# Patient Record
Sex: FEMALE | Race: ASIAN | NOT HISPANIC OR LATINO | Employment: FULL TIME | URBAN - METROPOLITAN AREA
[De-identification: names, ages, dates, MRNs, and addresses within clinical notes are randomized per-mention and may not be internally consistent; named-entity substitution may affect disease eponyms.]

---
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
## 2020-11-22 ENCOUNTER — HEALTH MAINTENANCE LETTER (OUTPATIENT)
Age: 20
End: 2020-11-22

## 2021-09-19 ENCOUNTER — HEALTH MAINTENANCE LETTER (OUTPATIENT)
Age: 21
End: 2021-09-19

## 2022-01-09 ENCOUNTER — HEALTH MAINTENANCE LETTER (OUTPATIENT)
Age: 22
End: 2022-01-09

## 2022-10-04 ENCOUNTER — OFFICE VISIT (OUTPATIENT)
Dept: FAMILY MEDICINE | Facility: CLINIC | Age: 22
End: 2022-10-04
Payer: COMMERCIAL

## 2022-10-04 VITALS
HEART RATE: 85 BPM | SYSTOLIC BLOOD PRESSURE: 97 MMHG | WEIGHT: 114.8 LBS | TEMPERATURE: 98.6 F | RESPIRATION RATE: 16 BRPM | BODY MASS INDEX: 24.77 KG/M2 | HEIGHT: 57 IN | OXYGEN SATURATION: 99 % | DIASTOLIC BLOOD PRESSURE: 62 MMHG

## 2022-10-04 DIAGNOSIS — H61.891 IRRITATION OF EXTERNAL EAR CANAL, RIGHT: Primary | ICD-10-CM

## 2022-10-04 PROCEDURE — 99203 OFFICE O/P NEW LOW 30 MIN: CPT | Performed by: FAMILY MEDICINE

## 2022-10-04 NOTE — PROGRESS NOTES
Assessment & Plan   Tina Palma is a 22 year old female without significant pmhx who present today as new patient for concerns below:    Irritation of external ear canal, right  Original bleeding likely related to irritation from chronic use of Q-tips.  Notably she also has significant inflammation of the canal on the left side.  Ongoing findings of dry blood likely related to ongoing irritation as she checks intermittently with Q-tips.  Also possibly dislodging healing crusts with ofloxacin drops.  Recommend to stop placing anything in her ears and at this time likely no indication for ofloxacin drops as well as no indication of otitis externa.  Recommend to leave the area alone, only using warm water to flush out her ears if needed, and expect will heal on its own over time. Reassurance provided given bleeding had previously stopped on its own, no pain, and no change in hearing. Recommend to follow-up if continues to have concerns.    Follow-up for CPE    I spent a total of 31 minutes on the day of the visit.   Time spent doing chart review, history and exam, documentation and further activities per the note    Benjamin Rosenstein, MD, MA  [facility]    Jerrod Wilder is a 22 year old, presenting for the following health issues:  Ear Problem (Liquid in right ear. Pt put q-tip in ear and blood came out. )      HPI   Right Ear  -Last week Saturday (9/24/2022) noticed some liquid in her ear, thought it was ear wax  -Put q-tip in ear to check, and saw blood  -No pain with use of qtip but noticed some stinging later  -Has used headphones in past at high volumes and wondered if related to that  -[facility], given antibiotic (ofloxacin) ear drops which she has been using for 5 days  -Bleeding until Sunday 9/25/22  -Still seeing some dry blood however when checks with qtip. Otherwise no evidence of ongoing bleeding, nothing on her  "pillow when she wakes.  -No pain in or around her ears. 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  Physical Exam     General: Awake, seated comfortably, appears well and NAD   HEENT:  - Head: Atraumatic, normocephalic  - Eyes: Corneas clear, sclera without injection, no discharge, EOMI, PERRLA  - Ears:   >Left: Canal patent with moderate irritation, no cerumen, TM normal with good landarks  >Right: Canal patent, dry blood in canal with area of bleeding along anterior canal wall, TM normal, no evidence of rupture  - Nose: Nares patent, no rhinorrhea, no congestion  - Throat: Oropharynx clear without lesions, mallampati IV  CV: RRR, normal S1/S2, no murmurs/rubs/gallops.  Pulm: Normal WOB, lungs CTAB, no wheezes or crackles, good air movement   Neuro: Alert, answering questions appropriately, normal thought processes; Normal Gait               "

## 2022-11-21 ENCOUNTER — HEALTH MAINTENANCE LETTER (OUTPATIENT)
Age: 22
End: 2022-11-21

## 2023-04-14 ENCOUNTER — ALLIED HEALTH/NURSE VISIT (OUTPATIENT)
Dept: RESEARCH | Facility: CLINIC | Age: 23
End: 2023-04-14
Payer: COMMERCIAL

## 2023-04-14 VITALS
DIASTOLIC BLOOD PRESSURE: 67 MMHG | SYSTOLIC BLOOD PRESSURE: 119 MMHG | OXYGEN SATURATION: 98 % | HEART RATE: 80 BPM | RESPIRATION RATE: 16 BRPM | BODY MASS INDEX: 24.16 KG/M2 | HEIGHT: 57 IN | WEIGHT: 112 LBS

## 2023-04-14 DIAGNOSIS — Z00.6 EXAMINATION OF PARTICIPANT OR CONTROL IN CLINICAL RESEARCH: Primary | ICD-10-CM

## 2023-04-14 PROCEDURE — 99207 PR NO CHARGE NURSE ONLY: CPT

## 2023-04-14 NOTE — PROGRESS NOTES
"  [place] Sleep Study Inclusion/Exclusion Criteria:    Study Name: [place]  : Fauzia Eng MD    Study Description: The purpose of this study is to collect sleep data for product research and development. We will compare the performance of the Smartwatch to a medical-grade Home Sleep Test (HST). We hope to learn whether the Smartwatch can be used to track sleep quality at home.    Protocol Version: 3.0  22-DEC-2022     Criteria #  Inclusion Criteria (ALL MUST BE YES)  YES/NO/N/A   1  Adult (age > 18 years old) Yes   2  Subject has a reliable WiFi network (examples of \"reliable\": able to video-conference call with a clear image, able to stream movies or video without interruption, play online computer games) Yes   3  Subject has access to a computer or smartphone with audiovisual capabilities (e.g., webcam and microphone/speaker*)  Yes   4  Subject is willing to comply with the study procedures    Yes         * applicable for subjects that are remotely consented and/or trained.     Criteria # Exclusion Criteria (ALL MUST BE NO) YES/NO/N/A   1  Active COVID infection   No   2  Decisionally impaired participants (no surrogate consenting is allowed)    No   3  Not understanding written and spoken English     No   4  Open wounds(s) on the wrist and/or forearm (in area where Smartwatch would be worn) No   5  Tattoos, large moles or scars on the dorsal aspect of wrist where the Smartwatch would be worn; individuals with tattoo on only one wrist may participate, if Smartwatch is worn on non-tattooed wrist    No   6  Known sensitivity or allergy to component of the Smartwatch   No   7  Planned travel across 2 or more times zones during study participation   No   8  Planned travel away from home for more than 5 days during the study period No   9  Overnight rotating shift work     No   10  Active and adherent to treatment for sleep apnea   (e.g., CPAP, dental appliance, Hypoglossal nerve stimulator)    " No   11  Plans to start treatment for apnea within the study timeframe    No   12  Overnight supplemental oxygen use   No   13    Employed by a company that develops or sells medical and/or fitness devices (e.g., ECG monitors, wearable fitness bands, sleep monitors, etc.) or are technology journalists (e.g., professional bloggers, TV, magazine, newspaper reporters, etc.) No   14    Participated in a previous sleep study that used a wrist-worn sensor device by same sponsor  No     Patient does fulfill study inclusion criteria and no exclusion criteria are found.     Fauzia Eng MD    14-APR-2023    Janis Blandon

## 2023-04-14 NOTE — PROGRESS NOTES
[place] Study Consent    Study Description: The purpose of this study is to collect sleep data for product research and development. We will compare the performance of the Smartwatch to a medical-grade Home Sleep Test (HST). We hope to learn whether the Smartwatch can be used to track sleep quality at home.        Tina Palma a 22 year old female, was on-site  today to discuss participation in the [place] sleep data collection study.     The consent form was reviewed with the patient.     The review of the study included:    Study Purpose     COVID-19 Criteria     Participant Responsibilities      Study Data and Devices    Benefits and Risks of Participation    Compensation and Costs of Participation    Voluntary Participation    Confidentiality     Injury and Legal Rights    Protocol Version: 3.0    The subject was queried in regards to her willingness to continue and her questions were answered to her satisfaction.     The patient has given her agreement to volunteer and participate in the above noted study.     The Consent  and HIPAA form version 4.0 28-FEB-2023 was signed at 13:24  on  14-APR-2023 with the Clinical Research Unit of [facility].     A copy of the [place] consent will be placed in subject's medical record. A copy of the consent form was given to the subject today.    Study data is directly entered into Epic and [employer] per protocol.     No study procedures were done prior to Tina Palma providing informed consent.       14-APR-2023    Janis Blandon

## 2023-04-14 NOTE — PROGRESS NOTES
"[facility] In-Person Study Note    Study Description: The purpose of this study is to collect sleep data for product research and development. We will compare the performance of the Smartwatch to a medical-grade Home Sleep Test (HST). We hope to learn whether the Smartwatch can be used to track sleep quality at home.       Subject ID:      SCREENING     Demographic Info  Tina Palma   2000          22 year old  female    Race:   Ethnicity: Non-/     Ceja Scale: OBAN Ceja: 2    Medical History:  Past Medical History:   Diagnosis Date     NO ACTIVE PROBLEMS        Sleep Apnea Diagnosis: No    Allergies:  No Known Allergies     Current Medications:           as of April 14, 2023  1:39 PM     You have not been prescribed any medications.         Past Surgical History:  Past Surgical History:   Procedure Laterality Date     NO HISTORY OF SURGERY                Vitals:  Time Subject Sat: 1300   /67 (BP Location: Right arm, Patient Position: Sitting, Cuff Size: Adult Regular)   Pulse 80   Resp 16   Ht 1.448 m (4' 9\")   Wt 50.8 kg (112 lb)   SpO2 98%   BMI 24.24 kg/m         Lifestyle  Occupation:     Do you have a Bed Partner/Co-Sleeper? No   Previous Sleep Study?: No       (If Yes) Initial AHI Score: N/A    Alcohol Use: No  Smoking History: No      Measurements & Preferences:  Dominant Hand: Right   Preferred Watch Wrist: Left    Left Wrist:  Circumference (mm): 150   Hairiness: A: Thin Hair, Low Density   Tattoos, Moles, Scars? None    Right Wrist:   Circumference (mm): 148   Hairiness: A: Thin Hair, Low Density   Tattoos, Moles, Scars? None    Was data collected?: No    ENROLLMENT & DEVICES      Device IDs:  Watch ID: [id number]    Watch Size: 40 mm   Band Size: M/L  Natural Notch: 4   Secure Notch: 4   Watch Setting Worn: 4   Phone ID: [id number]  Nox ID: [id number]     Has the Subject Enrolled in the Study Ace: Yes   Confirmation of Enrollment?: Yes "   Enrollment Date: 14-APR-2023  Smartwatch Training Completed? Yes   Smartwatch Training Date: 14-APR-2023  HSAT Training Completed? Yes   HSAT Training Date: 14-APR-2023 14-APR-2023  Janis Blandon

## 2023-04-16 ENCOUNTER — HEALTH MAINTENANCE LETTER (OUTPATIENT)
Age: 23
End: 2023-04-16

## 2023-04-18 NOTE — PROGRESS NOTES
[facility] HSAT Kit Return  Study Description: The purpose of this study is to collect sleep data for product research and development. We will compare the performance of the Smartwatch to a medical-grade Home Sleep Test (HST). We hope to learn whether the Smartwatch can be used to track sleep quality at home.      Subject Number:     Study Day: Week 2    Tracking Number: N/A    Compliance Questions:  Did you wear a T-Shirt over the heart monitor? Yes  Did you double tape device tubing/wires?Yes  Did you turn-off your heart monitor each morning after collection?Yes  Did all equipment function correctly and stay-on throughout night?Yes  Did you see the red light underneath the watch turn-on both nights?Yes      Participant returned HSAT kit with all devices returned. Participant verbalized understanding that if their data is unusable per sponsor, they will conduct one additional set of HSAT recordings. All other questions/concerns addressed.     Adverse Events & Con Med Assessment Performed?   [x] None    (If yes, please generate adverse event report for PI to ayaan)    18-APR-2023    Jessie Gibson RN

## 2023-04-19 ENCOUNTER — ALLIED HEALTH/NURSE VISIT (OUTPATIENT)
Dept: RESEARCH | Facility: CLINIC | Age: 23
End: 2023-04-19
Payer: COMMERCIAL

## 2023-04-19 DIAGNOSIS — Z00.6 RESEARCH SUBJECT: Primary | ICD-10-CM

## 2023-04-20 ENCOUNTER — TELEPHONE (OUTPATIENT)
Dept: RESEARCH | Facility: CLINIC | Age: 23
End: 2023-04-20
Payer: COMMERCIAL

## 2023-04-21 NOTE — TELEPHONE ENCOUNTER
[place] HSAT Results Phone Call  Study Description: The purpose of this study is to collect sleep data for product research and development. We will compare the performance of the Smartwatch to a medical-grade Home Sleep Test (HST). We hope to learn whether the Smartwatch can be used to track sleep quality at home.      Tina Palma was called today to review results of her Home Sleep Test (HSAT).     They were informed of the significance of the results and they affirmed understanding. Additionally I informed them that these results are available for review by their provider in their chart.  They had no further questions at this time.    Additional Comments: HR . Duration greater than 90 was 2.2 minutes    AHI Score: AHI less than 5  0.0, 0.6, 0.1, 0.4    Results:  NIGHT 1  Was it scorable?: Yes     Channel Presence: Scored and all 6 channels present  Select Missing Channels: N/A     (If applicable)      Analysis Start Date: 4/16/2023   Analysis Duration: 7hr 32min  Analysis Start Time: 10:50 pm        Analysis Stop Time: 6:22 am   Est Total Sleep Time: 6 hr 45 min      NIGHT 1 Scorer 1 Scorer 2   Respiratory Parameters   Apnea + Hypopneas (AH)   Total    0 /h   0.6 /h   Supine    0 /h   1 /h   Non-Supine   0 /h   0 /h   Count   0    4        Total Total    Obstructive (OA)    0 /h   0 /h   Hypopneas    0 /h   0.6 /h   Central Apnea Hypopnea (CA+CH)    0 /h   0 /h   Oxygen Saturation (SpO2)   Oxygen Desaturation Index (KIARA)    0.9 /h   0.9 /h   Minimum SpO2    93 %   93 %   SpO2 Duration < 88%    0 %   0 %   Average Desat Drop    4 %   4 %   Position & Analysis Time   Supine (in TST) Duration    246.5 min   246.5 min       NIGHT 2  Was it scorable?: Yes (If no, delete the table below)     Channel Presence: Scored and all 6 channels present  Select Missing Channels: N/A     (If applicable)    Analysis Start Date: 4/17/2023   Analysis Duration: 6hr 53min  Analysis Start Time: 11:27 pm        Analysis Stop Time:  6:20 am    Est Total Sleep Time: 6h 42 m       NIGHT 2 Scorer 1 Scorer 2   Respiratory Parameters   Apnea + Hypopneas (AH)   Total    0.1 /h   0.4 /h   Supine    0.7 /h   1.3 /h   Non-Supine   0 /h   0.2 /h   Count   1    3        Total Total    Obstructive (OA)    0.1 /h   0.1 /h   Hypopneas    0 /h   0.3 /h   Central Apnea Hypopnea (CA+CH)    0 /h   0 /h   Oxygen Saturation (SpO2)   Oxygen Desaturation Index (KIARA)    0.4 /h   0.4 /h   Minimum SpO2    90 %   90 %   SpO2 Duration < 88%    0 %   0 %   Average Desat Drop    7 %   7 %   Position & Analysis Time   Supine (in TST) Duration    89.7 min   89.7 min       20-APR-2023    Ruth Ly NP

## 2023-04-24 ENCOUNTER — VIRTUAL VISIT (OUTPATIENT)
Dept: RESEARCH | Facility: CLINIC | Age: 23
End: 2023-04-24
Payer: COMMERCIAL

## 2023-04-24 DIAGNOSIS — Z00.6 EXAMINATION OF PARTICIPANT OR CONTROL IN CLINICAL RESEARCH: Primary | ICD-10-CM

## 2023-04-24 PROCEDURE — 99207 PR NO CHARGE NURSE ONLY: CPT

## 2023-04-24 NOTE — PROGRESS NOTES
" [place] Study Phone Visit    Study Description: The purpose of this study is to collect sleep data for product research and development. We will compare the performance of the Smartwatch to a medical-grade Home Sleep Test (HST). We hope to learn whether the Smartwatch can be used to track sleep quality at home.      Subject Number:     Study Day: Week 3    [place] Study Subject was called today to:    Review Compliance     Answer subject questions    Deliver study protocol reminders to subject    Reminders Checklist:   [x]  Connected to WiFi  [x]  Completing both morning and evening surveys  [x]  Watch and Phone on  near each other after completed morning survey   [x]  Take devices off before showering/getting them wet  [x]  Make sure to dismiss any update notifications. -Tap \"Not Now\"  [x]  Remind them of next appointment with us     Adverse Events & Con Med Assessment Performed?   [x]     (If yes, please generate adverse event report for PI to cosign)      24-APR-2023    Arnol Kaplan    "

## 2023-05-01 ENCOUNTER — VIRTUAL VISIT (OUTPATIENT)
Dept: RESEARCH | Facility: CLINIC | Age: 23
End: 2023-05-01
Payer: COMMERCIAL

## 2023-05-01 DIAGNOSIS — Z00.6 EXAMINATION OF PARTICIPANT OR CONTROL IN CLINICAL RESEARCH: Primary | ICD-10-CM

## 2023-05-01 PROCEDURE — 99207 PR NO CHARGE NURSE ONLY: CPT

## 2023-05-01 NOTE — PROGRESS NOTES
" [place] Study Phone Visit    Study Description: The purpose of this study is to collect sleep data for product research and development. We will compare the performance of the Smartwatch to a medical-grade Home Sleep Test (HST). We hope to learn whether the Smartwatch can be used to track sleep quality at home.      Subject Number:     Study Day: Week 4    [place] Study Subject was called today to:    Review Compliance     Answer subject questions    Deliver study protocol reminders to subject    Reminders Checklist:   [x]  Connected to WiFi  [x]  Completing both morning and evening surveys  [x]  Watch and Phone on  near each other after completed morning survey   [x]  Take devices off before showering/getting them wet  [x] Make sure to dismiss any update notifications. -Tap \"Not Now\"  [x]  Good HSAT   [x]  Remind them of next appointment with us     Adverse Events & Con Med Assessment Performed?   [x]     (If yes, please generate adverse event report for PI to cosign)      1-MAY-2023    Steve Aj    "

## 2023-05-08 ENCOUNTER — VIRTUAL VISIT (OUTPATIENT)
Dept: RESEARCH | Facility: CLINIC | Age: 23
End: 2023-05-08
Payer: COMMERCIAL

## 2023-05-08 DIAGNOSIS — Z00.6 EXAMINATION OF PARTICIPANT OR CONTROL IN CLINICAL RESEARCH: Primary | ICD-10-CM

## 2023-05-08 PROCEDURE — 99207 PR NO CHARGE NURSE ONLY: CPT

## 2023-05-08 NOTE — PROGRESS NOTES
" [place] Study Phone Visit    Study Description: The purpose of this study is to collect sleep data for product research and development. We will compare the performance of the Smartwatch to a medical-grade Home Sleep Test (HST). We hope to learn whether the Smartwatch can be used to track sleep quality at home.      Subject Number:     Study Day: Week 5    [place] Study Subject was called today to:    Review Compliance     Answer subject questions    Deliver study protocol reminders to subject    Reminders Checklist:   [x]  Connected to WiFi  [x]  Completing both morning and evening surveys  [x]  Watch and Phone on  near each other after completed morning survey   [x] Take devices off before showering/getting them wet  [x]  Make sure to dismiss any update notifications. -Tap \"Not Now\"  [x]  Good HSAT   [x]  Remind them of next appointment with us     (Applicable during last 4 days)  [x]  NO WATCH WEAR JUST SURVEY     Adverse Events & Con Med Assessment Performed?   [x]     (If yes, please generate adverse event report for PI to cosign)      08-MAY-2023    Aman Shah      "

## 2023-05-18 ENCOUNTER — ALLIED HEALTH/NURSE VISIT (OUTPATIENT)
Dept: RESEARCH | Facility: CLINIC | Age: 23
End: 2023-05-18
Payer: COMMERCIAL

## 2023-05-18 DIAGNOSIS — Z00.6 EXAMINATION OF PARTICIPANT OR CONTROL IN CLINICAL RESEARCH: Primary | ICD-10-CM

## 2023-05-18 PROCEDURE — 99207 PR NO CHARGE NURSE ONLY: CPT

## 2023-05-18 NOTE — PROGRESS NOTES
[place] Study End Note    Study Description: The purpose of this study is to collect sleep data for product research and development. We will compare the performance of the Smartwatch to a medical-grade Home Sleep Test (HST). We hope to learn whether the Smartwatch can be used to track sleep quality at home.        Study Termination/Completion Date: 18-MAY-2023  Reason for Termination (If Applicable): Completed     Subject returned all study devices today and this completes this study for the subject.    Adverse Events & Con Med Assessment Performed?   [x]       18-MAY-2023    Aman Shah

## 2024-04-17 ENCOUNTER — OFFICE VISIT (OUTPATIENT)
Dept: FAMILY MEDICINE | Facility: CLINIC | Age: 24
End: 2024-04-17
Payer: COMMERCIAL

## 2024-04-17 VITALS
SYSTOLIC BLOOD PRESSURE: 103 MMHG | WEIGHT: 117.8 LBS | TEMPERATURE: 98 F | BODY MASS INDEX: 25.41 KG/M2 | HEIGHT: 57 IN | DIASTOLIC BLOOD PRESSURE: 71 MMHG | RESPIRATION RATE: 16 BRPM | OXYGEN SATURATION: 99 % | HEART RATE: 64 BPM

## 2024-04-17 DIAGNOSIS — Z11.59 NEED FOR HEPATITIS C SCREENING TEST: ICD-10-CM

## 2024-04-17 DIAGNOSIS — Z23 NEED FOR HPV VACCINATION: ICD-10-CM

## 2024-04-17 DIAGNOSIS — Z11.4 SCREENING FOR HIV (HUMAN IMMUNODEFICIENCY VIRUS): ICD-10-CM

## 2024-04-17 DIAGNOSIS — Z00.00 VISIT FOR PREVENTIVE HEALTH EXAMINATION: Primary | ICD-10-CM

## 2024-04-17 DIAGNOSIS — Z23 NEED FOR TDAP VACCINATION: ICD-10-CM

## 2024-04-17 DIAGNOSIS — Q52.4 MICROPERFORATE HYMEN: ICD-10-CM

## 2024-04-17 PROCEDURE — 36415 COLL VENOUS BLD VENIPUNCTURE: CPT

## 2024-04-17 PROCEDURE — 90651 9VHPV VACCINE 2/3 DOSE IM: CPT

## 2024-04-17 PROCEDURE — 90471 IMMUNIZATION ADMIN: CPT

## 2024-04-17 PROCEDURE — 90715 TDAP VACCINE 7 YRS/> IM: CPT

## 2024-04-17 PROCEDURE — G0123 SCREEN CERV/VAG THIN LAYER: HCPCS

## 2024-04-17 PROCEDURE — 99395 PREV VISIT EST AGE 18-39: CPT | Mod: 25

## 2024-04-17 PROCEDURE — 86803 HEPATITIS C AB TEST: CPT

## 2024-04-17 PROCEDURE — 90472 IMMUNIZATION ADMIN EACH ADD: CPT

## 2024-04-17 PROCEDURE — 87389 HIV-1 AG W/HIV-1&-2 AB AG IA: CPT

## 2024-04-17 SDOH — HEALTH STABILITY: PHYSICAL HEALTH: ON AVERAGE, HOW MANY MINUTES DO YOU ENGAGE IN EXERCISE AT THIS LEVEL?: 60 MIN

## 2024-04-17 SDOH — HEALTH STABILITY: PHYSICAL HEALTH: ON AVERAGE, HOW MANY DAYS PER WEEK DO YOU ENGAGE IN MODERATE TO STRENUOUS EXERCISE (LIKE A BRISK WALK)?: 3 DAYS

## 2024-04-17 ASSESSMENT — SOCIAL DETERMINANTS OF HEALTH (SDOH): HOW OFTEN DO YOU GET TOGETHER WITH FRIENDS OR RELATIVES?: TWICE A WEEK

## 2024-04-17 NOTE — PROGRESS NOTES
Preceptor Attestation:   Patient seen, evaluated and discussed with the resident. I have verified the content of the note, which accurately reflects my assessment of the patient and the plan of care.    Patient noted to have patent but very small vaginal opening. Cotton swab can pass without difficulty but finger/speculum cannot    Supervising Physician:Karen Gloria MD    [facility]

## 2024-04-17 NOTE — PATIENT INSTRUCTIONS

## 2024-04-17 NOTE — PROGRESS NOTES
"Preventive Care Visit  [facility]  Peyton Lomax MD, Urgent Care  Apr 17, 2024       SUBJECTIVE:   Tina is a 23 year old, presenting for the following:  Physical (Physical)    HPI    No interval history updates  Patient denies any specific concerns or questions    Today's PHQ-2 Score:       4/17/2024     1:57 PM   PHQ-2 ( 1999 [employer])   Q1: Little interest or pleasure in doing things 0   Q2: Feeling down, depressed or hopeless 0   PHQ-2 Score 0   Q1: Little interest or pleasure in doing things Not at all   Q2: Feeling down, depressed or hopeless Not at all   PHQ-2 Score 0     Social History     Tobacco Use    Smoking status: Never    Smokeless tobacco: Never   Substance Use Topics    Alcohol use: Never           4/17/2024     1:57 PM   Alcohol Use   Prescreen: >3 drinks/day or >7 drinks/week? No     Reviewed orders with patient.  Reviewed health maintenance and updated orders accordingly - Yes    Breast Cancer Screening: no family hx    History of abnormal Pap smear: no, has not had a pap smear     Reviewed and updated as needed this visit by clinical staff   Tobacco  Allergies  Meds   Med Hx  Surg Hx  Fam Hx  Soc Hx      Reviewed and updated as needed this visit by Provider   Tobacco     Med Hx  Surg Hx  Fam Hx  Soc Hx Sexual Activity          Review of Systems    Review of Systems  Constitutional, neuro, ENT, endocrine, pulmonary, cardiac, gastrointestinal, genitourinary, musculoskeletal, integument and psychiatric systems are negative, except as otherwise noted.    OBJECTIVE:   /71   Pulse 64   Temp 98  F (36.7  C) (Oral)   Resp 16   Ht 1.448 m (4' 9\")   Wt 53.4 kg (117 lb 12.8 oz)   SpO2 99%   BMI 25.49 kg/m     Estimated body mass index is 25.49 kg/m  as calculated from the following:    Height as of this encounter: 1.448 m (4' 9\").    Weight as of this encounter: 53.4 kg (117 lb 12.8 oz).  Physical Exam  GENERAL: alert and no distress  EYES: Eyes grossly " normal to inspection, PERRL and conjunctivae and sclerae normal  HENT: ear canals and TM's normal, nose and mouth without ulcers or lesions  NECK: no adenopathy, no asymmetry, masses, or scars  RESP: lungs clear to auscultation - no rales, rhonchi or wheezes  CV: regular rate and rhythm, normal S1 S2, no S3 or S4, no murmur, click or rub, no peripheral edema  ABDOMEN: soft, nontender, no hepatosplenomegaly, no masses and bowel sounds normal   (female): normal female external genitalia and normal urethral meatus; microperforate hymen, unable to pass speculum through vaginal introitus   MS: no gross musculoskeletal defects noted, no edema  SKIN: no suspicious lesions or rashes  NEURO: Normal strength and tone, mentation intact and speech normal  PSYCH: mentation appears normal, affect normal/bright    ASSESSMENT/PLAN:   Visit for preventive health examination  Patient here for annual physical. No significant interval history, no concerns today. No updates for family history. She does not smoke or drink alcohol. PHQ-2 normal. Due for vaccines, HIV, hep C screening. Due for pap smear; however, unable to obtain (see below). She has never been sexually active, is not planning on it in the near future. She is not interested in pregnancy. She has normal, regular periods.    Microperforate hymen  On exam, unable to pass the smallest speculum due to small opening- potentially microperforate hymen. She denies sexual activit or tampon placement. She has normal, regular periods. Will send to GYN to discuss possible procedures to address this and cervical cancer screening.  - Ob/Gyn  Referral; Future    Need for hepatitis C screening test  Due for recommended hepatitis C screening.  - Hepatitis C Screen Reflex to HCV RNA Quant and Genotype; Future  - Hepatitis C Screen Reflex to HCV RNA Quant and Genotype    Screening for HIV (human immunodeficiency virus)  Due for recommended HIV screening.  - HIV Screening; Future  -  HIV Screening    Need for HPV vaccination  Due for second dose of HPV9 vaccine.  - HPV9 (GARDASIL 9)    Need for Tdap vaccination  Due for update of tetanus.  - TDAP 10-64Y (ADACEL,BOOSTRIX)    Counseling  Reviewed preventive health counseling, as reflected in patient instructions    She reports that she has never smoked. She has never used smokeless tobacco.    Signed Electronically by: Peyton Lomax MD

## 2024-04-19 LAB
HCV AB SERPL QL IA: NONREACTIVE
HIV 1+2 AB+HIV1 P24 AG SERPL QL IA: NONREACTIVE

## 2024-04-23 LAB
BKR LAB AP GYN ADEQUACY: NORMAL
BKR LAB AP GYN INTERPRETATION: NORMAL
BKR LAB AP HPV REFLEX: NORMAL
BKR LAB AP PREVIOUS ABNORMAL: NORMAL
PATH REPORT.COMMENTS IMP SPEC: NORMAL
PATH REPORT.COMMENTS IMP SPEC: NORMAL
PATH REPORT.RELEVANT HX SPEC: NORMAL

## 2024-06-21 ENCOUNTER — OFFICE VISIT (OUTPATIENT)
Dept: OBGYN | Facility: CLINIC | Age: 24
End: 2024-06-21
Attending: FAMILY MEDICINE
Payer: COMMERCIAL

## 2024-06-21 VITALS
WEIGHT: 114 LBS | HEART RATE: 94 BPM | DIASTOLIC BLOOD PRESSURE: 66 MMHG | SYSTOLIC BLOOD PRESSURE: 104 MMHG | BODY MASS INDEX: 24.67 KG/M2 | OXYGEN SATURATION: 99 %

## 2024-06-21 DIAGNOSIS — Z12.4 CERVICAL CANCER SCREENING: ICD-10-CM

## 2024-06-21 DIAGNOSIS — N89.6 INTACT HYMENAL RING: Primary | ICD-10-CM

## 2024-06-21 PROCEDURE — 99203 OFFICE O/P NEW LOW 30 MIN: CPT | Performed by: OBSTETRICS & GYNECOLOGY

## 2024-06-21 PROCEDURE — G0145 SCR C/V CYTO,THINLAYER,RESCR: HCPCS | Performed by: OBSTETRICS & GYNECOLOGY

## 2024-06-21 PROCEDURE — 99459 PELVIC EXAMINATION: CPT | Performed by: OBSTETRICS & GYNECOLOGY

## 2024-06-21 NOTE — PROGRESS NOTES
"[facility]- OBGYN    CC: question of microperforate hymen    S:Tina Palma is a 23 year old  who presents today for evaluation of vaginal and hymen anatomy as well as pap smear if possible.  Patient reports she had menarche at age 14.  She does not recall any vaginal pain, heaviness, or fullness during her periods at any point in her life.  She does have some mild low abdominal cramping during periods which has been \"normal\" and consistent for her.  She denies ever trying to use tampons or have intercourse.    24- saw chuyita Moralez.  Was noted that hymen had \"small opening\" and was not able to place a speculum through, but was able to place a q tip through opening.  She was referred to gyn due to question of microperforate hymen.    OBGYN Hx:     Patient's last menstrual period was 2024.  Menses:regular, monthly, lasting 7 days of normal amount of bleeding  Has never been sexually active  STD Hx:none  Pap hx:none    PMH: none  PSH:none  Meds:none  Allergies:NKDA  SH:Denies any tobacco, alcohol, or drug use    O: Patient Vitals for the past 24 hrs:   BP Pulse SpO2 Weight   24 1312 104/66 94 99 % 51.7 kg (114 lb)   ]  Exam:  General- awake, alert, answering questions appropriately, appears comfortable  CV- regular rate  Lung- breathing comfortably on room air  Patient verbally consented to pelvic exam including external visual exam, speculum exam, and bimanual exam.  Exam chaperone: Waremando Palma MA  - normal appearing bilateral labia majora, normal appearing bilateral labial minora, introitus is small in diameter.  The urethral meatus appears normal.  The intact hymenal ring is visualized.  There is a patent hymen present with small diameter in proportion to the small vaginal introitus.  Two q tips easily pass through hymenal opening.  Virginal spec initially used in attempt to complete exam.  Patient with discomfort, thus switch to pediatric speculum.  Pediatric spec placed through " hymenal opening with minimal patient discomfort.  Able to visualize anterior lip of cervix which appears normal.  Vaginal walls appear normal.  No vaginal discharge.  Pap collected.      A&P: Tina Palma is a 23 year old  who presents today for evaluation of vaginal and hymen anatomy as well as pap smear if possible.    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  Explained her anatomy on exam.  She has a small introitus and proportionally small opening of her intact hymen.  It does not appear to be an abnormal anatomic variation of the hymen, thus surgical intervention is not indicated.  I explained to patient if she desires to work towards being able to comfortably and easily place a tampon or have penetrative vaginal intercourse in the future, then working with pelvic floor PT and vaginal dilators would be a good option.  She is accepting of this referral and does plan to take that next step.    Plan: Physical Therapy  Referral    (Z12.4) Cervical cancer screening  Comment: pap collected  Plan: Pap screen only - recommended age 21 - 24 years          Whitney Ayoub MD

## 2024-06-26 LAB
BKR LAB AP GYN ADEQUACY: NORMAL
BKR LAB AP GYN INTERPRETATION: NORMAL
BKR LAB AP HPV REFLEX: NO
BKR LAB AP PREVIOUS ABNORMAL: NORMAL
PATH REPORT.COMMENTS IMP SPEC: NORMAL
PATH REPORT.COMMENTS IMP SPEC: NORMAL
PATH REPORT.RELEVANT HX SPEC: NORMAL

## 2024-12-09 ENCOUNTER — OFFICE VISIT (OUTPATIENT)
Dept: FAMILY MEDICINE | Facility: CLINIC | Age: 24
End: 2024-12-09
Payer: COMMERCIAL

## 2024-12-09 VITALS
BODY MASS INDEX: 24.59 KG/M2 | OXYGEN SATURATION: 100 % | TEMPERATURE: 97.6 F | RESPIRATION RATE: 20 BRPM | DIASTOLIC BLOOD PRESSURE: 69 MMHG | WEIGHT: 114 LBS | HEART RATE: 66 BPM | SYSTOLIC BLOOD PRESSURE: 108 MMHG | HEIGHT: 57 IN

## 2024-12-09 DIAGNOSIS — L02.92 BOIL: Primary | ICD-10-CM

## 2024-12-09 RX ORDER — SULFAMETHOXAZOLE AND TRIMETHOPRIM 800; 160 MG/1; MG/1
1 TABLET ORAL 2 TIMES DAILY
Qty: 10 TABLET | Refills: 0 | Status: SHIPPED | OUTPATIENT
Start: 2024-12-09 | End: 2024-12-14

## 2024-12-09 NOTE — PATIENT INSTRUCTIONS
- continue to soak 1-2 times per day in the tub to promote drainage  - start antibiotics and take twice daily for 5 days. This might cause some mild stomach upset and loose stools. Probiotics may help.   - okay to use ibuprofen for pain relief 600 mg three times per day  - Follow up if any worsening in the next week. The area should continue to reduce in size over the next few days

## 2024-12-09 NOTE — PROGRESS NOTES
Assessment & Plan     Boil  3 x 1 cm boil with some reduction in size after spontaneous drainage 1 week prior. Has been present now for about 3 weeks. Some ongoing induration with a small area of fluctuance today, so an incision & drainage procedure was performed with a small amount of purulent drainage. Wound culture obtained. Will begin empiric treatment with Bactrim DS BID x 5 days given size and persistence of the boil. Suspect hair removal is the inciting factor here.  - Reviewed care instructions in detail. Soaking and heat packs to promote healing and drainage, NSAIDs as needed for pain, etc. See patient instructions. Return precautions provided.  - Anaerobic bacterial culture; Future - will adjust empiric antibiotics accordingly.  - Anaerobic bacterial culture  - Incision and drainage  - sulfamethoxazole-trimethoprim (BACTRIM DS) 800-160 MG tablet; Take 1 tablet by mouth 2 times daily for 5 days.    Follow up if not improving as expected in the next week or any worsening.     Subjective   Tina is a 24 year old, presenting for the following health issues:  Mass (Locating between rectal area. Pain starting 12/2/24. The lump pop by it self on the next day after patient notice the pain)      12/9/2024     8:03 AM   Additional Questions   Roomed by Paw         12/9/2024    Information    services provided? No        HPI     2 week history of a bump in the rectal area. At its biggest it was the size of a quarter. She has had associated pain at the site. It popped about one week ago. She was using heat packs to promote drainage. No further drainage. Swelling is going down. She has been treating the area with Vaseline.    She's had a few episodes in the past year similar to this that were smaller. The bump is in a location where she removes hair through shaving. No systemic symptoms such as fevers/chills or body aches. Normal energy level.         Objective    /69   Pulse 66   Temp  "97.6  F (36.4  C) (Oral)   Resp 20   Ht 1.448 m (4' 9\")   Wt 51.7 kg (114 lb)   LMP 11/16/2024   SpO2 100%   BMI 24.67 kg/m    Body mass index is 24.67 kg/m .  Physical Exam   GENERAL: alert and no distress  HENT: moist mucous membranes  RESP: normal rate and effort  SKIN: 3 x 1 cm abscess in the left posterior vulvar area that is indurated with some central fluctuance and mild tenderness to palpation    Procedure note:    A timeout protocol was performed prior to initiating the procedure.  We reviewed the risks and benefits of the procedure and the patient agreed to proceed. The area was prepared and draped in the usual, sterile manner. The site was anesthetized with 1% lidocaine with epinephrine. A linear incision approximately 0.75 mm along the local skin lines was made with an 11 blade and a small amount of purulent material expressed.  Bleeding was minimal. A bacterial wound culture was obtained. No pacing was placed. The wound was covered with a bandage.    Signed Electronically by: Marjorie Young MD    "

## 2024-12-11 LAB — BACTERIA ABSC ANAEROBE+AEROBE CULT: NORMAL

## 2025-03-18 ENCOUNTER — PATIENT OUTREACH (OUTPATIENT)
Dept: CARE COORDINATION | Facility: CLINIC | Age: 25
End: 2025-03-18
Payer: COMMERCIAL

## 2025-03-27 ENCOUNTER — PATIENT OUTREACH (OUTPATIENT)
Dept: CARE COORDINATION | Facility: CLINIC | Age: 25
End: 2025-03-27
Payer: COMMERCIAL

## 2025-04-10 ENCOUNTER — PATIENT OUTREACH (OUTPATIENT)
Dept: CARE COORDINATION | Facility: CLINIC | Age: 25
End: 2025-04-10
Payer: COMMERCIAL

## 2025-05-31 ENCOUNTER — HEALTH MAINTENANCE LETTER (OUTPATIENT)
Age: 25
End: 2025-05-31